# Patient Record
Sex: FEMALE
[De-identification: names, ages, dates, MRNs, and addresses within clinical notes are randomized per-mention and may not be internally consistent; named-entity substitution may affect disease eponyms.]

---

## 2024-01-10 ENCOUNTER — APPOINTMENT (OUTPATIENT)
Dept: COLORECTAL SURGERY | Facility: CLINIC | Age: 54
End: 2024-01-10
Payer: COMMERCIAL

## 2024-01-10 DIAGNOSIS — K64.4 RESIDUAL HEMORRHOIDAL SKIN TAGS: ICD-10-CM

## 2024-01-10 PROBLEM — Z00.00 ENCOUNTER FOR PREVENTIVE HEALTH EXAMINATION: Status: ACTIVE | Noted: 2024-01-10

## 2024-01-10 PROCEDURE — 46600 DIAGNOSTIC ANOSCOPY SPX: CPT

## 2024-01-10 PROCEDURE — 99203 OFFICE O/P NEW LOW 30 MIN: CPT | Mod: 25

## 2024-01-10 NOTE — ASSESSMENT
[FreeTextEntry1] : Prominent ext hemorrhoid/skin tag -We discussed treatment options at length including observation and surgical excision -risks and benefits were reviewed including bleeding, infection and recurrence -pt will consider her options and contact the office if she wishes to proceed -all questions answered

## 2024-01-10 NOTE — CONSULT LETTER
[Dear  ___] : Dear  [unfilled], [Consult Letter:] : I had the pleasure of evaluating your patient, [unfilled]. [Please see my note below.] : Please see my note below. [Consult Closing:] : Thank you very much for allowing me to participate in the care of this patient.  If you have any questions, please do not hesitate to contact me. [Sincerely,] : Sincerely, [FreeTextEntry2] : Kennedy Friedman [FreeTextEntry3] : Carter Chappell MD FACS Chief Colon and Rectal Surgery [facility]

## 2024-01-10 NOTE — PHYSICAL EXAM
[Abdomen Masses] : No abdominal masses [Abdomen Tenderness] : ~T No ~M abdominal tenderness [JVD] : no jugular venous distention  [Wheezing] : no wheezing was heard [Normal Rate and Rhythm] : normal rate and rhythm [No Rash or Lesion] : No rash or lesion [Alert] : alert [Oriented to Person] : oriented to person [Oriented to Place] : oriented to place [Oriented to Time] : oriented to time [Calm] : calm [de-identified] : NAD [de-identified] : EOMI [de-identified] : SHELL no edema [FreeTextEntry1] : ext anal exam-large ant midline skin tag/ext hemorrhoid.  soft nt eulalia-normal tone no masses anoscopy-procedure performed in standard fashion under direct vision with a standard anoscope.  Pt tolerated w/o event or complication -No proctitis -mod internal hemorrhoids

## 2024-01-10 NOTE — HISTORY OF PRESENT ILLNESS
[FreeTextEntry1] : 54 yo female presents for evaluation of external anal skin.  Pt with history of anal fissure treated conservatively.  It has now resolved and she reports a large ext hemorrhoid.  She denies pain or bleeding.  No fevers or chills.  Denies abdominal pain.  Some minor difficulty with hygene.  Hx of normal colonoscopy.  No family hx of colon cancer or IBD